# Patient Record
Sex: MALE | Race: WHITE | NOT HISPANIC OR LATINO | Employment: UNEMPLOYED | URBAN - METROPOLITAN AREA
[De-identification: names, ages, dates, MRNs, and addresses within clinical notes are randomized per-mention and may not be internally consistent; named-entity substitution may affect disease eponyms.]

---

## 2020-02-28 ENCOUNTER — HOSPITAL ENCOUNTER (INPATIENT)
Facility: MEDICAL CENTER | Age: 30
LOS: 3 days | DRG: 963 | End: 2020-03-02
Attending: EMERGENCY MEDICINE | Admitting: SURGERY
Payer: COMMERCIAL

## 2020-02-28 ENCOUNTER — HOSPITAL ENCOUNTER (OUTPATIENT)
Dept: RADIOLOGY | Facility: MEDICAL CENTER | Age: 30
End: 2020-02-28
Payer: COMMERCIAL

## 2020-02-28 ENCOUNTER — APPOINTMENT (OUTPATIENT)
Dept: RADIOLOGY | Facility: MEDICAL CENTER | Age: 30
DRG: 963 | End: 2020-02-28
Attending: EMERGENCY MEDICINE
Payer: COMMERCIAL

## 2020-02-28 ENCOUNTER — APPOINTMENT (OUTPATIENT)
Dept: RADIOLOGY | Facility: MEDICAL CENTER | Age: 30
DRG: 963 | End: 2020-02-28
Payer: COMMERCIAL

## 2020-02-28 DIAGNOSIS — S36.039A LACERATION OF SPLEEN, INITIAL ENCOUNTER: ICD-10-CM

## 2020-02-28 DIAGNOSIS — S37.032A LACERATION OF LEFT KIDNEY, INITIAL ENCOUNTER: ICD-10-CM
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
## 2020-02-28 DIAGNOSIS — T14.90XA TRAUMA: ICD-10-CM

## 2020-02-28 PROBLEM — Z53.09 CONTRAINDICATION TO DEEP VEIN THROMBOSIS (DVT) PROPHYLAXIS: Status: ACTIVE | Noted: 2020-02-28

## 2020-02-28 PROBLEM — S37.002A: Status: ACTIVE | Noted: 2020-02-28

## 2020-02-28 PROBLEM — S36.00XA: Status: ACTIVE | Noted: 2020-02-28

## 2020-02-28 LAB
ABO + RH BLD: NORMAL
ABO GROUP BLD: NORMAL
ALBUMIN SERPL BCP-MCNC: 3.7 G/DL (ref 3.2–4.9)
ALBUMIN/GLOB SERPL: 1.6 G/DL
ALP SERPL-CCNC: 39 U/L (ref 30–99)
ALT SERPL-CCNC: 15 U/L (ref 2–50)
ANION GAP SERPL CALC-SCNC: 5 MMOL/L (ref 0–11.9)
APTT PPP: 26.3 SEC (ref 24.7–36)
AST SERPL-CCNC: 21 U/L (ref 12–45)
BILIRUB SERPL-MCNC: 1.2 MG/DL (ref 0.1–1.5)
BLD GP AB SCN SERPL QL: NORMAL
BUN SERPL-MCNC: 15 MG/DL (ref 8–22)
CALCIUM SERPL-MCNC: 7.9 MG/DL (ref 8.5–10.5)
CFT BLD TEG: 3.5 MIN (ref 5–10)
CHLORIDE SERPL-SCNC: 105 MMOL/L (ref 96–112)
CLOT ANGLE BLD TEG: 72.3 DEGREES (ref 53–72)
CLOT LYSIS 30M P MA LENFR BLD TEG: 0 % (ref 0–8)
CO2 SERPL-SCNC: 23 MMOL/L (ref 20–33)
CREAT SERPL-MCNC: 0.96 MG/DL (ref 0.5–1.4)
CT.EXTRINSIC BLD ROTEM: 1.2 MIN (ref 1–3)
ERYTHROCYTE [DISTWIDTH] IN BLOOD BY AUTOMATED COUNT: 38.6 FL (ref 35.9–50)
ETHANOL BLD-MCNC: 0 G/DL
GLOBULIN SER CALC-MCNC: 2.3 G/DL (ref 1.9–3.5)
GLUCOSE SERPL-MCNC: 99 MG/DL (ref 65–99)
HCT VFR BLD AUTO: 34 % (ref 42–52)
HGB BLD-MCNC: 11 G/DL (ref 14–18)
HGB BLD-MCNC: 11.6 G/DL (ref 14–18)
INR PPP: 1.1 (ref 0.87–1.13)
LACTATE BLD-SCNC: 1.1 MMOL/L (ref 0.5–2)
MAGNESIUM SERPL-MCNC: 1.7 MG/DL (ref 1.5–2.5)
MCF BLD TEG: 64.4 MM (ref 50–70)
MCH RBC QN AUTO: 29.9 PG (ref 27–33)
MCHC RBC AUTO-ENTMCNC: 34.1 G/DL (ref 33.7–35.3)
MCV RBC AUTO: 87.6 FL (ref 81.4–97.8)
PA AA BLD-ACNC: 0 %
PA ADP BLD-ACNC: 25.2 %
PHOSPHATE SERPL-MCNC: 4 MG/DL (ref 2.5–4.5)
PLATELET # BLD AUTO: 193 K/UL (ref 164–446)
PMV BLD AUTO: 10 FL (ref 9–12.9)
POTASSIUM SERPL-SCNC: 4.5 MMOL/L (ref 3.6–5.5)
PROT SERPL-MCNC: 6 G/DL (ref 6–8.2)
PROTHROMBIN TIME: 14.4 SEC (ref 12–14.6)
RBC # BLD AUTO: 3.88 M/UL (ref 4.7–6.1)
RH BLD: NORMAL
SODIUM SERPL-SCNC: 133 MMOL/L (ref 135–145)
TEG ALGORITHM TGALG: ABNORMAL
WBC # BLD AUTO: 16.5 K/UL (ref 4.8–10.8)

## 2020-02-28 PROCEDURE — 99291 CRITICAL CARE FIRST HOUR: CPT

## 2020-02-28 PROCEDURE — 71045 X-RAY EXAM CHEST 1 VIEW: CPT

## 2020-02-28 PROCEDURE — G0390 TRAUMA RESPONS W/HOSP CRITI: HCPCS

## 2020-02-28 PROCEDURE — 86850 RBC ANTIBODY SCREEN: CPT

## 2020-02-28 PROCEDURE — 80307 DRUG TEST PRSMV CHEM ANLYZR: CPT

## 2020-02-28 PROCEDURE — 85347 COAGULATION TIME ACTIVATED: CPT

## 2020-02-28 PROCEDURE — 85610 PROTHROMBIN TIME: CPT

## 2020-02-28 PROCEDURE — A9270 NON-COVERED ITEM OR SERVICE: HCPCS | Performed by: SURGERY

## 2020-02-28 PROCEDURE — 86901 BLOOD TYPING SEROLOGIC RH(D): CPT

## 2020-02-28 PROCEDURE — 85730 THROMBOPLASTIN TIME PARTIAL: CPT

## 2020-02-28 PROCEDURE — 85018 HEMOGLOBIN: CPT

## 2020-02-28 PROCEDURE — 85384 FIBRINOGEN ACTIVITY: CPT

## 2020-02-28 PROCEDURE — 700111 HCHG RX REV CODE 636 W/ 250 OVERRIDE (IP): Performed by: SURGERY

## 2020-02-28 PROCEDURE — 85027 COMPLETE CBC AUTOMATED: CPT

## 2020-02-28 PROCEDURE — 85576 BLOOD PLATELET AGGREGATION: CPT | Mod: 91

## 2020-02-28 PROCEDURE — 770022 HCHG ROOM/CARE - ICU (200)

## 2020-02-28 PROCEDURE — 83605 ASSAY OF LACTIC ACID: CPT

## 2020-02-28 PROCEDURE — 80053 COMPREHEN METABOLIC PANEL: CPT

## 2020-02-28 PROCEDURE — 700105 HCHG RX REV CODE 258: Performed by: SURGERY

## 2020-02-28 PROCEDURE — 83735 ASSAY OF MAGNESIUM: CPT

## 2020-02-28 PROCEDURE — 86900 BLOOD TYPING SEROLOGIC ABO: CPT

## 2020-02-28 PROCEDURE — 700102 HCHG RX REV CODE 250 W/ 637 OVERRIDE(OP): Performed by: SURGERY

## 2020-02-28 PROCEDURE — 84100 ASSAY OF PHOSPHORUS: CPT

## 2020-02-28 RX ORDER — SODIUM CHLORIDE, SODIUM LACTATE, POTASSIUM CHLORIDE, CALCIUM CHLORIDE 600; 310; 30; 20 MG/100ML; MG/100ML; MG/100ML; MG/100ML
INJECTION, SOLUTION INTRAVENOUS CONTINUOUS
Status: DISCONTINUED | OUTPATIENT
Start: 2020-02-28 | End: 2020-03-01

## 2020-02-28 RX ORDER — POLYETHYLENE GLYCOL 3350 17 G/17G
1 POWDER, FOR SOLUTION ORAL 2 TIMES DAILY
Status: DISCONTINUED | OUTPATIENT
Start: 2020-02-28 | End: 2020-03-02 | Stop reason: HOSPADM

## 2020-02-28 RX ORDER — OXYCODONE HYDROCHLORIDE 5 MG/1
5 TABLET ORAL
Status: DISCONTINUED | OUTPATIENT
Start: 2020-02-28 | End: 2020-03-02 | Stop reason: HOSPADM

## 2020-02-28 RX ORDER — ONDANSETRON 2 MG/ML
4 INJECTION INTRAMUSCULAR; INTRAVENOUS EVERY 4 HOURS PRN
Status: DISCONTINUED | OUTPATIENT
Start: 2020-02-28 | End: 2020-03-02 | Stop reason: HOSPADM

## 2020-02-28 RX ORDER — ACETAMINOPHEN 500 MG
1000 TABLET ORAL EVERY 6 HOURS
Status: DISCONTINUED | OUTPATIENT
Start: 2020-02-28 | End: 2020-03-02 | Stop reason: HOSPADM

## 2020-02-28 RX ORDER — AMOXICILLIN 250 MG
1 CAPSULE ORAL
Status: DISCONTINUED | OUTPATIENT
Start: 2020-02-28 | End: 2020-03-02 | Stop reason: HOSPADM

## 2020-02-28 RX ORDER — FAMOTIDINE 20 MG/1
20 TABLET, FILM COATED ORAL 2 TIMES DAILY
Status: DISCONTINUED | OUTPATIENT
Start: 2020-02-28 | End: 2020-02-29

## 2020-02-28 RX ORDER — DOCUSATE SODIUM 100 MG/1
100 CAPSULE, LIQUID FILLED ORAL 2 TIMES DAILY
Status: DISCONTINUED | OUTPATIENT
Start: 2020-02-28 | End: 2020-03-02 | Stop reason: HOSPADM

## 2020-02-28 RX ORDER — BISACODYL 10 MG
10 SUPPOSITORY, RECTAL RECTAL
Status: DISCONTINUED | OUTPATIENT
Start: 2020-02-28 | End: 2020-03-02 | Stop reason: HOSPADM

## 2020-02-28 RX ORDER — AMOXICILLIN 250 MG
1 CAPSULE ORAL NIGHTLY
Status: DISCONTINUED | OUTPATIENT
Start: 2020-02-28 | End: 2020-02-29

## 2020-02-28 RX ORDER — HYDROMORPHONE HYDROCHLORIDE 1 MG/ML
0.5 INJECTION, SOLUTION INTRAMUSCULAR; INTRAVENOUS; SUBCUTANEOUS
Status: DISCONTINUED | OUTPATIENT
Start: 2020-02-28 | End: 2020-03-02 | Stop reason: HOSPADM

## 2020-02-28 RX ORDER — ENEMA 19; 7 G/133ML; G/133ML
1 ENEMA RECTAL
Status: DISCONTINUED | OUTPATIENT
Start: 2020-02-28 | End: 2020-03-02 | Stop reason: HOSPADM

## 2020-02-28 RX ADMIN — SENNOSIDES AND DOCUSATE SODIUM 1 TABLET: 8.6; 5 TABLET ORAL at 20:11

## 2020-02-28 RX ADMIN — HYDROMORPHONE HYDROCHLORIDE 0.5 MG: 1 INJECTION, SOLUTION INTRAMUSCULAR; INTRAVENOUS; SUBCUTANEOUS at 17:27

## 2020-02-28 RX ADMIN — ACETAMINOPHEN 1000 MG: 500 TABLET ORAL at 23:22

## 2020-02-28 RX ADMIN — FAMOTIDINE 20 MG: 20 TABLET ORAL at 17:28

## 2020-02-28 RX ADMIN — SODIUM CHLORIDE, POTASSIUM CHLORIDE, SODIUM LACTATE AND CALCIUM CHLORIDE: 600; 310; 30; 20 INJECTION, SOLUTION INTRAVENOUS at 19:15

## 2020-02-28 RX ADMIN — ACETAMINOPHEN 1000 MG: 500 TABLET ORAL at 17:28

## 2020-02-28 ASSESSMENT — LIFESTYLE VARIABLES: EVER_SMOKED: NEVER

## 2020-02-28 ASSESSMENT — FIBROSIS 4 INDEX: FIB4 SCORE: 0.76

## 2020-02-29 ENCOUNTER — APPOINTMENT (OUTPATIENT)
Dept: RADIOLOGY | Facility: MEDICAL CENTER | Age: 30
DRG: 963 | End: 2020-02-29
Attending: SURGERY
Payer: COMMERCIAL

## 2020-02-29 LAB
ALBUMIN SERPL BCP-MCNC: 3.7 G/DL (ref 3.2–4.9)
ALBUMIN/GLOB SERPL: 1.7 G/DL
ALP SERPL-CCNC: 37 U/L (ref 30–99)
ALT SERPL-CCNC: 14 U/L (ref 2–50)
ANION GAP SERPL CALC-SCNC: 5 MMOL/L (ref 0–11.9)
AST SERPL-CCNC: 20 U/L (ref 12–45)
BASOPHILS # BLD AUTO: 0.2 % (ref 0–1.8)
BASOPHILS # BLD: 0.02 K/UL (ref 0–0.12)
BILIRUB SERPL-MCNC: 2.1 MG/DL (ref 0.1–1.5)
BUN SERPL-MCNC: 10 MG/DL (ref 8–22)
CALCIUM SERPL-MCNC: 8.3 MG/DL (ref 8.5–10.5)
CHLORIDE SERPL-SCNC: 104 MMOL/L (ref 96–112)
CO2 SERPL-SCNC: 26 MMOL/L (ref 20–33)
CREAT SERPL-MCNC: 1.05 MG/DL (ref 0.5–1.4)
EOSINOPHIL # BLD AUTO: 0.04 K/UL (ref 0–0.51)
EOSINOPHIL NFR BLD: 0.5 % (ref 0–6.9)
ERYTHROCYTE [DISTWIDTH] IN BLOOD BY AUTOMATED COUNT: 39.4 FL (ref 35.9–50)
GLOBULIN SER CALC-MCNC: 2.2 G/DL (ref 1.9–3.5)
GLUCOSE SERPL-MCNC: 85 MG/DL (ref 65–99)
HCT VFR BLD AUTO: 31.8 % (ref 42–52)
HGB BLD-MCNC: 11 G/DL (ref 14–18)
HGB BLD-MCNC: 11.8 G/DL (ref 14–18)
IMM GRANULOCYTES # BLD AUTO: 0.02 K/UL (ref 0–0.11)
IMM GRANULOCYTES NFR BLD AUTO: 0.2 % (ref 0–0.9)
LYMPHOCYTES # BLD AUTO: 2.14 K/UL (ref 1–4.8)
LYMPHOCYTES NFR BLD: 24.3 % (ref 22–41)
MAGNESIUM SERPL-MCNC: 1.9 MG/DL (ref 1.5–2.5)
MCH RBC QN AUTO: 30.6 PG (ref 27–33)
MCHC RBC AUTO-ENTMCNC: 34.6 G/DL (ref 33.7–35.3)
MCV RBC AUTO: 88.6 FL (ref 81.4–97.8)
MONOCYTES # BLD AUTO: 0.67 K/UL (ref 0–0.85)
MONOCYTES NFR BLD AUTO: 7.6 % (ref 0–13.4)
NEUTROPHILS # BLD AUTO: 5.91 K/UL (ref 1.82–7.42)
NEUTROPHILS NFR BLD: 67.2 % (ref 44–72)
NRBC # BLD AUTO: 0 K/UL
NRBC BLD-RTO: 0 /100 WBC
PHOSPHATE SERPL-MCNC: 3.4 MG/DL (ref 2.5–4.5)
PLATELET # BLD AUTO: 173 K/UL (ref 164–446)
PMV BLD AUTO: 10 FL (ref 9–12.9)
POTASSIUM SERPL-SCNC: 3.9 MMOL/L (ref 3.6–5.5)
PROT SERPL-MCNC: 5.9 G/DL (ref 6–8.2)
RBC # BLD AUTO: 3.59 M/UL (ref 4.7–6.1)
SODIUM SERPL-SCNC: 135 MMOL/L (ref 135–145)
WBC # BLD AUTO: 8.8 K/UL (ref 4.8–10.8)

## 2020-02-29 PROCEDURE — 700112 HCHG RX REV CODE 229: Performed by: SURGERY

## 2020-02-29 PROCEDURE — 93970 EXTREMITY STUDY: CPT | Mod: 26 | Performed by: INTERNAL MEDICINE

## 2020-02-29 PROCEDURE — 83735 ASSAY OF MAGNESIUM: CPT

## 2020-02-29 PROCEDURE — 80053 COMPREHEN METABOLIC PANEL: CPT

## 2020-02-29 PROCEDURE — 85018 HEMOGLOBIN: CPT

## 2020-02-29 PROCEDURE — 700102 HCHG RX REV CODE 250 W/ 637 OVERRIDE(OP): Performed by: SURGERY

## 2020-02-29 PROCEDURE — 700105 HCHG RX REV CODE 258: Performed by: SURGERY

## 2020-02-29 PROCEDURE — 99231 SBSQ HOSP IP/OBS SF/LOW 25: CPT | Performed by: SURGERY

## 2020-02-29 PROCEDURE — A9270 NON-COVERED ITEM OR SERVICE: HCPCS | Performed by: SURGERY

## 2020-02-29 PROCEDURE — 700111 HCHG RX REV CODE 636 W/ 250 OVERRIDE (IP): Performed by: SURGERY

## 2020-02-29 PROCEDURE — 84100 ASSAY OF PHOSPHORUS: CPT

## 2020-02-29 PROCEDURE — 85025 COMPLETE CBC W/AUTO DIFF WBC: CPT

## 2020-02-29 PROCEDURE — 93970 EXTREMITY STUDY: CPT

## 2020-02-29 PROCEDURE — 770006 HCHG ROOM/CARE - MED/SURG/GYN SEMI*

## 2020-02-29 PROCEDURE — 71045 X-RAY EXAM CHEST 1 VIEW: CPT

## 2020-02-29 RX ORDER — LISDEXAMFETAMINE DIMESYLATE 20 MG/1
20 CAPSULE ORAL DAILY
COMMUNITY

## 2020-02-29 RX ORDER — LISDEXAMFETAMINE DIMESYLATE CAPSULES 20 MG/1
20 CAPSULE ORAL DAILY
Status: DISCONTINUED | OUTPATIENT
Start: 2020-03-01 | End: 2020-02-29

## 2020-02-29 RX ADMIN — FAMOTIDINE 20 MG: 20 TABLET ORAL at 05:13

## 2020-02-29 RX ADMIN — ACETAMINOPHEN 1000 MG: 500 TABLET ORAL at 11:43

## 2020-02-29 RX ADMIN — SODIUM CHLORIDE, POTASSIUM CHLORIDE, SODIUM LACTATE AND CALCIUM CHLORIDE: 600; 310; 30; 20 INJECTION, SOLUTION INTRAVENOUS at 03:27

## 2020-02-29 RX ADMIN — ACETAMINOPHEN 1000 MG: 500 TABLET ORAL at 05:13

## 2020-02-29 RX ADMIN — ACETAMINOPHEN 1000 MG: 500 TABLET ORAL at 23:45

## 2020-02-29 RX ADMIN — DOCUSATE SODIUM 100 MG: 100 CAPSULE, LIQUID FILLED ORAL at 05:13

## 2020-02-29 RX ADMIN — ONDANSETRON 4 MG: 2 INJECTION INTRAMUSCULAR; INTRAVENOUS at 13:50

## 2020-02-29 RX ADMIN — DOCUSATE SODIUM 100 MG: 100 CAPSULE, LIQUID FILLED ORAL at 18:12

## 2020-02-29 RX ADMIN — FAMOTIDINE 20 MG: 20 TABLET ORAL at 18:12

## 2020-02-29 RX ADMIN — ACETAMINOPHEN 1000 MG: 500 TABLET ORAL at 18:12

## 2020-02-29 RX ADMIN — OXYCODONE HYDROCHLORIDE 5 MG: 5 TABLET ORAL at 23:45

## 2020-02-29 RX ADMIN — OXYCODONE HYDROCHLORIDE 5 MG: 5 TABLET ORAL at 13:50

## 2020-02-29 RX ADMIN — ONDANSETRON 4 MG: 2 INJECTION INTRAMUSCULAR; INTRAVENOUS at 23:48

## 2020-02-29 ASSESSMENT — PATIENT HEALTH QUESTIONNAIRE - PHQ9
SUM OF ALL RESPONSES TO PHQ9 QUESTIONS 1 AND 2: 0
2. FEELING DOWN, DEPRESSED, IRRITABLE, OR HOPELESS: NOT AT ALL
1. LITTLE INTEREST OR PLEASURE IN DOING THINGS: NOT AT ALL

## 2020-02-29 ASSESSMENT — LIFESTYLE VARIABLES
EVER_SMOKED: NEVER
TOTAL SCORE: 0
ALCOHOL_USE: NO
AVERAGE NUMBER OF DAYS PER WEEK YOU HAVE A DRINK CONTAINING ALCOHOL: 2
EVER FELT BAD OR GUILTY ABOUT YOUR DRINKING: NO
EVER HAD A DRINK FIRST THING IN THE MORNING TO STEADY YOUR NERVES TO GET RID OF A HANGOVER: NO
CONSUMPTION TOTAL: POSITIVE
HOW MANY TIMES IN THE PAST YEAR HAVE YOU HAD 5 OR MORE DRINKS IN A DAY: 5
HAVE YOU EVER FELT YOU SHOULD CUT DOWN ON YOUR DRINKING: NO
DOES PATIENT WANT TO STOP DRINKING: NO
TOTAL SCORE: 0
TOTAL SCORE: 0
ON A TYPICAL DAY WHEN YOU DRINK ALCOHOL HOW MANY DRINKS DO YOU HAVE: 3
SUBSTANCE_ABUSE: 0
HAVE PEOPLE ANNOYED YOU BY CRITICIZING YOUR DRINKING: NO

## 2020-02-29 ASSESSMENT — ENCOUNTER SYMPTOMS
DOUBLE VISION: 0
SHORTNESS OF BREATH: 0
FLANK PAIN: 0
FEVER: 0
SPEECH CHANGE: 0
SENSORY CHANGE: 0
FOCAL WEAKNESS: 0
MYALGIAS: 1
ABDOMINAL PAIN: 1

## 2020-02-29 ASSESSMENT — COGNITIVE AND FUNCTIONAL STATUS - GENERAL
SUGGESTED CMS G CODE MODIFIER DAILY ACTIVITY: CH
DAILY ACTIVITIY SCORE: 24
MOBILITY SCORE: 24
SUGGESTED CMS G CODE MODIFIER MOBILITY: CH

## 2020-02-29 ASSESSMENT — COPD QUESTIONNAIRES
IN THE PAST 12 MONTHS DO YOU DO LESS THAN YOU USED TO BECAUSE OF YOUR BREATHING PROBLEMS: DISAGREE/UNSURE
HAVE YOU SMOKED AT LEAST 100 CIGARETTES IN YOUR ENTIRE LIFE: NO/DON'T KNOW
DO YOU EVER COUGH UP ANY MUCUS OR PHLEGM?: NO/ONLY WITH OCCASIONAL COLDS OR INFECTIONS
DURING THE PAST 4 WEEKS HOW MUCH DID YOU FEEL SHORT OF BREATH: NONE/LITTLE OF THE TIME

## 2020-02-29 ASSESSMENT — FIBROSIS 4 INDEX: FIB4 SCORE: 0.83

## 2020-02-29 NOTE — ASSESSMENT & PLAN NOTE
Snowboarding crash. Blunt torso trauma.  Trauma Yellow Transfer Activation from [facility].  Gene Guallpa MD. Trauma Surgery.

## 2020-02-29 NOTE — CARE PLAN
Problem: Communication  Goal: The ability to communicate needs accurately and effectively will improve  Outcome: PROGRESSING AS EXPECTED  Note: Patient is oriented, uses call light appropriately, notifies staff to needs.      Problem: Venous Thromboembolism (VTW)/Deep Vein Thrombosis (DVT) Prevention:  Goal: Patient will participate in Venous Thrombosis (VTE)/Deep Vein Thrombosis (DVT)Prevention Measures  Outcome: PROGRESSING AS EXPECTED  Note: Pharmacologic prophylaxis contraindicated, pt unable to mobilize d/t strict BR orders, SCDs in place, pt moves within confines of bed.      Problem: Knowledge Deficit  Goal: Knowledge of disease process/condition, treatment plan, diagnostic tests, and medications will improve  Outcome: PROGRESSING AS EXPECTED  Note: Keeping patient updated to plan of care, tests, results.

## 2020-02-29 NOTE — ED NOTES
Pt brought in by [facility]. Pt was snowboarding, hit icy spot and fell. Pt complaining of abdominal pain.

## 2020-02-29 NOTE — ASSESSMENT & PLAN NOTE
Anterior and inferior splenic injury with moderate hemoperitoneum.   Grade 4 injury.  Non operative management.  2/29 two stable Hgb, mobilize.  3/2 Hgb trending up.  Trend abdominal exam and laboratory studies.

## 2020-02-29 NOTE — PROGRESS NOTES
Pt arrived to S119 with ACLS RN. VSS.    2 RN skin check with BIANCA Zapata.  No areas of concern noted. Pt repositioning self in bed.    Belongings checked with BIANCA Zapata.   Apple watch  Apple cell phone  White cable headphones  Black baseball cap  Karely ski socks  Arcteryx ski pants  Samuel sweater  White long sleeve thermal  Black helly landaverde ski jacket  Sunglasses  Neck gaiter  Ski boots  Gray knit hat

## 2020-02-29 NOTE — CARE PLAN
Problem: Communication  Goal: The ability to communicate needs accurately and effectively will improve  Outcome: PROGRESSING AS EXPECTED     Problem: Safety  Goal: Will remain free from injury  Outcome: PROGRESSING AS EXPECTED     Problem: Venous Thromboembolism (VTW)/Deep Vein Thrombosis (DVT) Prevention:  Goal: Patient will participate in Venous Thrombosis (VTE)/Deep Vein Thrombosis (DVT)Prevention Measures  Outcome: PROGRESSING AS EXPECTED     Problem: Knowledge Deficit  Goal: Knowledge of disease process/condition, treatment plan, diagnostic tests, and medications will improve  Outcome: PROGRESSING AS EXPECTED     Problem: Pain Management  Goal: Pain level will decrease to patient's comfort goal  Outcome: PROGRESSING AS EXPECTED

## 2020-02-29 NOTE — PROGRESS NOTES
Trauma Progress Note 2/29/2020 4:16 AM    This is a 29 y.o. male who crashed while snowboarding. He sustained grade 3 spleen injury managed non operatively. His hemogram has been stable through the night.     Plan:   - continue serial hemograms and abdominal exams    Assessment: awake, alert, pain well controlled    /55   Pulse 75   Temp 36.8 °C (98.3 °F) (Temporal)   Resp (!) 32   Wt 77.1 kg (169 lb 15.6 oz)   SpO2 98%     Hemoglobin: 11.0 g/dL  Hematocrit: 31.8 %    Lactic Acid: 1.1 mmol/L.    Urine Output: voiding / adequate output    Recent Labs     02/28/20  1640   APTT 26.3   INR 1.10      Recent Labs     02/28/20  1700   REACTMIN 3.5*   CLOTKINET 1.2   CLOTANGL 72.3*   MAXCLOTS 64.4   UQX36OAB 0.0   PRCINADP 25.2   PRCINAA 0.0     Traumatic injury of the kidney, left, initial encounter- (present on admission)  Assessment & Plan  Small horizontal laceration to left kidney. Grade 3 injury.  Non operative management.  Trend abdominal exam and laboratory studies.    Trauma to spleen, initial encounter- (present on admission)  Assessment & Plan  Anterior and inferior splenic injury with moderate hemoperitoneum.   Grade 4 injury  Non operative management.  Trend abdominal exam and laboratory studies. Bed rest.    Contraindication to deep vein thrombosis (DVT) prophylaxis- (present on admission)  Assessment & Plan  Systemic anticoagulation contraindicated secondary to elevated bleeding risk.  3/2 Surveillance venous duplex scanning ordered.    Trauma- (present on admission)  Assessment & Plan  Snowboarding crash. Blunt torso trauma.  Trauma Yellow Transfer Activation from [facility].  Gene Guallpa MD. Trauma Surgery.

## 2020-02-29 NOTE — PROGRESS NOTES
2 RN skin check completed with BIANCA Stevens. Patient skin intact,no skin breakdown noted on assessment, right elbow red and blanching.

## 2020-02-29 NOTE — PROGRESS NOTES
"TRAUMA TERTIARY SURVEY     Mental status adequate for full examination?: Yes    Spine cleared (radiologically and/or clinically): Yes    PHYSICAL EXAMINATION:  Vitals: /73   Pulse 78   Temp 36.8 °C (98.3 °F) (Temporal)   Resp (!) 26   Ht 1.727 m (5' 8\")   Wt 80.2 kg (176 lb 12.9 oz)   SpO2 98%   BMI 26.88 kg/m²   Constitutional:     General Appearance: appears stated age.  HEENT:     No significant external craniofacial trauma. The pupils are equal, round, and reactive to light bilaterally. The extraocular muscles are intact bilaterally. The nares and oropharynx are clear. The midface and jaw are stable. No malocclusion is evident.  Neck:    Normal range of motion.  Respiratory:   Inspection: adominnal pain with inspiration   Palpation:  The chest is nontender. The clavicles are non deformed bilaterally..   Auscultation: clear to auscultation.  Cardiovascular:   Auscultation: regular rate and rhythm.   Peripheral Pulses: Normal.   Abdomen:   Left upper quadrant tenderness with palpation  Genitourinary:   (MALE): urine concentrated without blood.  Musculoskeletal:   The pelvis is stable.  No significant angulation, deformity, or soft tissue injury involving the upper and lower extremities. Normal range of motion.   Back:   The thoracolumbar spine was examined. Examination is remarkable for no significant tenderness, swelling, or deformity in the thoracolumbar region.  Skin:   The skin is warm and dry.  Neurologic:    [place] Coma Scale (GCS) 15 [id number]. Neurologic examination revealed no focal deficits noted.  Psychiatric:   The patient does not appear depressed or anxious.    IMAGING:  DX-CHEST-PORTABLE (1 VIEW)   Final Result      No acute cardiopulmonary abnormality. No interval change.      DX-CHEST-LIMITED (1 VIEW)   Final Result      No acute cardiopulmonary process is identified.      OUTSIDE IMAGES-CT ABDOMEN /PELVIS   Final Result      US-ABORTED US PROCEDURE    (Results Pending)   US-TRAUMA VEIN " SCREEN LOWER BILAT EXTREMITY    (Results Pending)     All current laboratory studies/radiology exams reviewed: Yes    Completed Consultations:  No consults required with this hospitalization     Pending Consultations:  none    Newly Identified Diagnoses and Injuries:  No further findings    TOTAL RAP SCORE:  RAP Score Total: 2      ETOH Screening  CAGE Score: 0  Assessment complete date: 2/29/2020

## 2020-02-29 NOTE — ASSESSMENT & PLAN NOTE
Systemic anticoagulation contraindicated secondary to elevated bleeding risk.  3/2 Surveillance venous duplex scanning ordered.

## 2020-02-29 NOTE — RESPIRATORY CARE
Responded to trauma yellow. Pt sat 97% on RA. No respiratory distress noted. Respiratory released by ER MD.

## 2020-02-29 NOTE — ED PROVIDER NOTES
ED Provider Note    CHIEF COMPLAINT  Splenic laceration, snowboard accident, trauma transfer    HPI  Yoan Brito is a 29 y.o. male who presents via transfer for evaluation of a splenic and renal laceration status post snowboard accident.  Comes in from [facility] in [facility], the patient complains mainly of abdominal pain, primarily in the left upper quadrant but states that in the ensuing hours since the accident he is developed left shoulder pain.  He did not have shoulder pain or chest pain initially after the accident has no shortness of breath.  He had a CT of the abdomen and pelvis revealing these injuries along with a significant amount of hemoperitoneum.  He does not of a headache or neck pain or back pain, he has no weakness numbness or tingling, no medical problems and at this time offers no other complaints.    REVIEW OF SYSTEMS  Negative for headache, neck pain, focal weakness, focal numbness, focal tingling, back pain. All other systems are negative.     PAST MEDICAL HISTORY  No past medical history on file.    FAMILY HISTORY  No family history on file.    SOCIAL HISTORY  Social History     Tobacco Use   • Smoking status: Not on file   Substance Use Topics   • Alcohol use: Not on file   • Drug use: Not on file       SURGICAL HISTORY  No past surgical history on file.    CURRENT MEDICATIONS  I personally reviewed the medication list in the charting documentation.     ALLERGIES  Allergies not on file    MEDICAL RECORD  I have reviewed patient's medical record and pertinent results are listed above.      PHYSICAL EXAM  VITAL SIGNS: /51   Pulse 87   Temp 37.1 °C (98.8 °F)   Resp 18   SpO2 99% Comment: RA   Constitutional: An alert patient in no acute distress  HENT: Mucus membranes moist.  Oropharynx is clear. Head is atraumatic.  Eyes: Pupils are equal and reactive to light. EOMI. Normal conjunctiva.    Neck: Nontender, comfortable full range of motion.  Cardiovascular:  Regular heart rate and rhythm.   Thorax & Lungs: Chest is nontender. No ecchymosis, abrasions or other traumatic injury noted.  Lungs are clear to auscultation with good air movement bilaterally.  Abdomen: Soft, moderate generalized tenderness, the abdomen is firm but not distended no ecchymosis, abrasions or other traumatic injury noted.  Skin: Warm, dry. No rash appreciated  Extremities/Musculoskeletal: Full range of motion of left shoulder, no deformity, no tenderness, neurovascularly intact.  The other 3 extremities are without trauma.  Neurologic: Alert & oriented. CN II-XII grossly intact. Normal and symmetric motor and sensory functions upper and lower extremities bilaterally. No focal deficits observed.   Psychiatric: Normal affect appropriate for the clinical situation.    DIAGNOSTIC STUDIES / PROCEDURES    LABS  Results for orders placed or performed during the hospital encounter of 02/28/20   DIAGNOSTIC ALCOHOL   Result Value Ref Range    Diagnostic Alcohol 0.00 0.00 g/dL   CBC WITHOUT DIFFERENTIAL   Result Value Ref Range    WBC 16.5 (H) 4.8 - 10.8 K/uL    RBC 3.88 (L) 4.70 - 6.10 M/uL    Hemoglobin 11.6 (L) 14.0 - 18.0 g/dL    Hematocrit 34.0 (L) 42.0 - 52.0 %    MCV 87.6 81.4 - 97.8 fL    MCH 29.9 27.0 - 33.0 pg    MCHC 34.1 33.7 - 35.3 g/dL    RDW 38.6 35.9 - 50.0 fL    Platelet Count 193 164 - 446 K/uL    MPV 10.0 9.0 - 12.9 fL   Comp Metabolic Panel   Result Value Ref Range    Sodium 133 (L) 135 - 145 mmol/L    Potassium 4.5 3.6 - 5.5 mmol/L    Chloride 105 96 - 112 mmol/L    Co2 23 20 - 33 mmol/L    Anion Gap 5.0 0.0 - 11.9    Glucose 99 65 - 99 mg/dL    Bun 15 8 - 22 mg/dL    Creatinine 0.96 0.50 - 1.40 mg/dL    Calcium 7.9 (L) 8.5 - 10.5 mg/dL    AST(SGOT) 21 12 - 45 U/L    ALT(SGPT) 15 2 - 50 U/L    Alkaline Phosphatase 39 30 - 99 U/L    Total Bilirubin 1.2 0.1 - 1.5 mg/dL    Albumin 3.7 3.2 - 4.9 g/dL    Total Protein 6.0 6.0 - 8.2 g/dL    Globulin 2.3 1.9 - 3.5 g/dL    A-G Ratio 1.6 g/dL    Prothrombin Time   Result Value Ref Range    PT 14.4 12.0 - 14.6 sec    INR 1.10 0.87 - 1.13   APTT   Result Value Ref Range    APTT 26.3 24.7 - 36.0 sec   COD - Adult (Type and Screen)   Result Value Ref Range    ABO Grouping Only B     Rh Grouping Only POS     Antibody Screen-Cod NEG    ABO Rh Confirm   Result Value Ref Range    ABO Rh Confirm B POS    Platelet Mapping (TEG)   Result Value Ref Range    Reaction Time Initial-R 3.5 (L) 5.0 - 10.0 min    Clot Kinetics-K 1.2 1.0 - 3.0 min    Clot Angle-Angle 72.3 (H) 53.0 - 72.0 degrees    Maximum Clot Strength-MA 64.4 50.0 - 70.0 mm    Lysis 30 minutes-LY30 0.0 0.0 - 8.0 %    % Inhibition ADP 25.2 %    % Inhibition AA 0.0 %    TEG Algorithm Link Algorithm    ESTIMATED GFR   Result Value Ref Range    GFR If African American >60 >60 mL/min/1.73 m 2    GFR If Non African American >60 >60 mL/min/1.73 m 2        COURSE & MEDICAL DECISION MAKING  I have reviewed any medical record information, laboratory studies and radiographic results as noted above.    Encounter Summary: This is a 29 y.o. male with splenic laceration as well as a small renal laceration status post snowboarding accident, hemodynamically stable with normal blood pressure normal heart rate, seems to be an isolated injury although he is complaining of left shoulder pain which is likely referred, he does not have overt evidence of trauma and dislocation, chest x-ray in the trauma bay reveals no pneumothorax or obvious bony abnormality.  Will obtain blood work to trend his hemoglobin, the patient will be admitted to the hospital in guarded condition    DISPOSITION: Admit in guarded condition      FINAL IMPRESSION  1. Laceration of spleen, initial encounter    2. Laceration of left kidney, initial encounter    3. Snowboard accident, initial encounter           This dictation was created using voice recognition software. The accuracy of the dictation is limited to the abilities of the software. I expect there  may be some errors of grammar and possibly content. The nursing notes were reviewed and certain aspects of this information were incorporated into this note.    Electronically signed by: Hernan Bacon M.D., 2/28/2020 5:01 PM

## 2020-02-29 NOTE — H&P
Trauma History and Physical  2/28/2020    Attending Physician: Gene Guallpa MD.     CC: Trauma The patient was triaged as a Trauma Yellow Transfer in accordance with established pre hospital protols. An expeditious primary and secondary survey with required adjuncts was conducted. See Trauma Narrator for full details.    HPI: This is a 29 y.o male presents to [facility] for for injuries sustained while snowboarding.  This is a transfer from [facility] in [place] for evaluation of a splenic and renal laceration status.   The patient complains mainly of abdominal pain, primarily in the left upper quadrant as well as left shoulder pain.  He did not have shoulder pain or chest pain initially after the accident.  He has no shortness of breath.  He had a CT of the abdomen and pelvis revealing a grade 3 left renal injury as well as a grade 4 splenic injury along with a significant amount of hemoperitoneum.      He does not of a headache or neck pain or back pain, he has no weakness numbness or tingling, no medical problems and at this time offers no other complaints.   He has not taken ASA or NSAIDs    No past medical history on file.    No past surgical history on file.    Current Facility-Administered Medications   Medication Dose Route Frequency Provider Last Rate Last Dose   • Respiratory Therapy Consult   Nebulization Continuous RT Gene Guallpa M.D.       • Pharmacy Consult Request ...Pain Management Review 1 Each  1 Each Other PHARMACY TO DOSE Gene Guallpa M.D.       • docusate sodium (COLACE) capsule 100 mg  100 mg Oral BID Gene Guallpa M.D.       • senna-docusate (PERICOLACE or SENOKOT S) 8.6-50 MG per tablet 1 Tab  1 Tab Oral Nightly Gene Guallpa M.D.       • senna-docusate (PERICOLACE or SENOKOT S) 8.6-50 MG per tablet 1 Tab  1 Tab Oral Q24HRS PRN Gene Guallpa M.D.       • polyethylene glycol/lytes (MIRALAX) PACKET 1 Packet  1 Packet  Oral BID Gene Guallpa M.D.       • magnesium hydroxide (MILK OF MAGNESIA) suspension 30 mL  30 mL Oral DAILY Gene Guallpa M.D.       • bisacodyl (DULCOLAX) suppository 10 mg  10 mg Rectal Q24HRS PRN Gene Guallpa M.D.       • fleet enema 133 mL  1 Each Rectal Once PRN Gene Guallpa M.D.       • LR infusion   Intravenous Continuous Gene Guallpa M.D.       • acetaminophen (TYLENOL) tablet 1,000 mg  1,000 mg Oral Q6HRS Gene Guallpa M.D.   1,000 mg at 02/28/20 1728   • HYDROmorphone pf (DILAUDID) injection 0.5 mg  0.5 mg Intravenous Q HOUR PRN Gene Guallpa M.D.   0.5 mg at 02/28/20 1727   • oxyCODONE immediate-release (ROXICODONE) tablet 5 mg  5 mg Oral Q3HRS PRN Gene Guallpa M.D.       • ondansetron (ZOFRAN) syringe/vial injection 4 mg  4 mg Intravenous Q4HRS PRN Gene Guallpa M.D.       • famotidine (PEPCID) tablet 20 mg  20 mg Oral BID Gene Guallpa M.D.   20 mg at 02/28/20 1728    Or   • famotidine (PEPCID) injection 20 mg  20 mg Intravenous BID Gene Guallpa M.D.           Social History     Socioeconomic History   • Marital status: Not on file     Spouse name: Not on file   • Number of children: Not on file   • Years of education: Not on file   • Highest education level: Not on file   Occupational History   • Not on file   Social Needs   • Financial resource strain: Not on file   • Food insecurity     Worry: Not on file     Inability: Not on file   • Transportation needs     Medical: Not on file     Non-medical: Not on file   Tobacco Use   • Smoking status: Not on file   Substance and Sexual Activity   • Alcohol use: Not on file   • Drug use: Not on file   • Sexual activity: Not on file   Lifestyle   • Physical activity     Days per week: Not on file     Minutes per session: Not on file   • Stress: Not on file   Relationships   • Social connections     Talks on phone: Not on file     Gets together: Not on file     Attends Rastafari  service: Not on file     Active member of club or organization: Not on file     Attends meetings of clubs or organizations: Not on file     Relationship status: Not on file   • Intimate partner violence     Fear of current or ex partner: Not on file     Emotionally abused: Not on file     Physically abused: Not on file     Forced sexual activity: Not on file   Other Topics Concern   • Not on file   Social History Narrative   • Not on file       No family history on file.    Allergies:  Patient has no allergy information on record.    Review of Systems:  Constitutional: Negative for fever, chills, weight loss, malaise/fatigue and diaphoresis.   HENT: Negative for hearing loss, ear pain, nosebleeds, congestion, sore throat, neck pain, and ear discharge.    Eyes: Negative for blurred vision, double vision, and redness.   Respiratory: Negative for cough, sputum production, shortness of breath, wheezing and stridor.    Cardiovascular: Negative for chest pain, palpitations.   Gastrointestinal: Negative for heartburn, nausea, vomiting, diarrhea, constipation.  Positive for abdominal pain, .  Genitourinary: Negative for dysuria, urgency, frequency.   Musculoskeletal: Negative for myalgias, back pain, joint pain and falls. Positive for left shoulder pain.  Skin: Negative for itching and rash.  Neurological: Negative for dizziness, loss of consciousness, weakness and headaches.   Endo/Heme/Allergies: Negative for environmental allergies. Does not bruise/bleed easily.   Psychiatric/Behavioral: Negative for depression and substance abuse. The patient is not nervous/anxious.    Physical Exam:  /60   Pulse 79   Temp 36.2 °C (97.1 °F) (Temporal)   Resp 19   Wt 77.1 kg (169 lb 15.6 oz)   SpO2 97%     Constitutional: Awake, alert, oriented x3. No acute distress. GCS 15. E4 V5 M6.  Head: No cephalohematoma. Pupils are 2 mm,  reactive bilaterally. Midface stable. No malocclusion.  TMs clear bilaterally. No drainage from the  mouth or nose.  Neck: No tracheal deviation. No midline cervical spine tenderness.   Cardiovascular: Normal rate, regular rhythm, normal heart sounds and intact distal pulses.  Exam reveals no gallop and no friction rub.  No murmur heard.  Pulmonary/Chest: Clavicles nontender to palpation. There is no chest wall tenderness bilaterally.  No crepitus. Positive breath sounds bilaterally.   Abdominal: Soft, nondistended.  Mildly tender to palpation -greatest in the left upper quadrant.  There is no guarding or rebound tenderness. Pelvis is stable to anterior-posterior compression.   Musculoskeletal: Right upper extremity grossly atraumatic, palpable radial pulse. 5/5  strength. Full ROM and strength at elbow.  Left upper extremity grossly atraumatic, palpable radial pulse. 5/5  strength. Full ROM and strength at elbow.  Right lower extremity grossly atraumatic. 5/5 strength in ankle plantar flexion and dorsiflexion. No pain and full ROM at right knee and hip.   Left  lower extremity grossly atraumatic. 5/5 strength in ankle plantar flexion and dorsiflexion. No pain and full ROM at left knee and hip.   Back: Midline thoracic and lumbar spines are nontender to palpation. No step-offs.   : Normal male external genitalia. Rectal exam not done. No blood visible at urethral meatus.   Neurological: Sensation intact to light touch dorsum and plantar surfaces of both feet and the medial and lateral aspects of both lower legs.  Sensation intact to light touch dorsum and plantar surfaces of both hands.   Skin: Skin is warm and dry.  No diaphoresis. No erythema. No pallor.     Labs:  Recent Labs     02/28/20  1640   WBC 16.5*   RBC 3.88*   HEMOGLOBIN 11.6*   HEMATOCRIT 34.0*   MCV 87.6   MCH 29.9   MCHC 34.1   RDW 38.6   PLATELETCT 193   MPV 10.0     Recent Labs     02/28/20  1640   SODIUM 133*   POTASSIUM 4.5   CHLORIDE 105   CO2 23   GLUCOSE 99   BUN 15   CREATININE 0.96   CALCIUM 7.9*     Recent Labs     02/28/20  1640    APTT 26.3   INR 1.10     Recent Labs     02/28/20  1640   ASTSGOT 21   ALTSGPT 15   TBILIRUBIN 1.2   ALKPHOSPHAT 39   GLOBULIN 2.3   INR 1.10       Radiology:  DX-CHEST-LIMITED (1 VIEW)   Final Result      No acute cardiopulmonary process is identified.      OUTSIDE IMAGES-CT ABDOMEN /PELVIS   Final Result      US-ABORTED US PROCEDURE    (Results Pending)   DX-CHEST-PORTABLE (1 VIEW)    (Results Pending)   US-TRAUMA VEIN SCREEN LOWER BILAT EXTREMITY    (Results Pending)         Assessment: This is a 29 y.o male snowboarder with grade 4 splenic injury and a grade 3 left renal injury    Plan:     Admit to the ICU  Serial hemoglobins  TEG with platelet mapping -treat if abnormal  Continue bedrest  Trauma duplex of the lower extremities ordered for tomorrow      Trauma  Snowboarding crash. Blunt torso trauma.  Trauma Yellow Transfer Activation from [facility].  Gene Guallpa MD. Trauma Surgery.    Contraindication to deep vein thrombosis (DVT) prophylaxis  Systemic anticoagulation contraindicated secondary to elevated bleeding risk.  3/2 Surveillance venous duplex scanning ordered.    Trauma to spleen, initial encounter  Anterior and inferior splenic injury with moderate hemoperitoneum.   Grade 4 injury  Non operative management.  Trend abdominal exam and laboratory studies. Bed rest.    Traumatic injury of the kidney, left, initial encounter  Small horizontal laceration to left kidney. Grade 3 injury.  Non operative management.  Trend abdominal exam and laboratory studies.    Time spent: Trauma / Critical Care Time 60 minutes excluding procedures.    Gene Guallpa MD  [facility]  [phone]

## 2020-02-29 NOTE — ASSESSMENT & PLAN NOTE
Small horizontal laceration to left kidney.   Grade 3 injury.  Non operative management  3/2 BUN/Cr within normal limits    Trend abdominal exam and laboratory studies.
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
## 2020-03-01 ENCOUNTER — APPOINTMENT (OUTPATIENT)
Dept: RADIOLOGY | Facility: MEDICAL CENTER | Age: 30
DRG: 963 | End: 2020-03-01
Attending: SURGERY
Payer: COMMERCIAL

## 2020-03-01 LAB
ALBUMIN SERPL BCP-MCNC: 4.3 G/DL (ref 3.2–4.9)
ALBUMIN/GLOB SERPL: 1.5 G/DL
ALP SERPL-CCNC: 44 U/L (ref 30–99)
ALT SERPL-CCNC: 14 U/L (ref 2–50)
ANION GAP SERPL CALC-SCNC: 8 MMOL/L (ref 0–11.9)
AST SERPL-CCNC: 17 U/L (ref 12–45)
BASOPHILS # BLD AUTO: 0.2 % (ref 0–1.8)
BASOPHILS # BLD AUTO: 0.3 % (ref 0–1.8)
BASOPHILS # BLD: 0.03 K/UL (ref 0–0.12)
BASOPHILS # BLD: 0.03 K/UL (ref 0–0.12)
BILIRUB SERPL-MCNC: 1.5 MG/DL (ref 0.1–1.5)
BUN SERPL-MCNC: 9 MG/DL (ref 8–22)
CALCIUM SERPL-MCNC: 9.4 MG/DL (ref 8.5–10.5)
CHLORIDE SERPL-SCNC: 101 MMOL/L (ref 96–112)
CO2 SERPL-SCNC: 25 MMOL/L (ref 20–33)
CREAT SERPL-MCNC: 1.11 MG/DL (ref 0.5–1.4)
EOSINOPHIL # BLD AUTO: 0.02 K/UL (ref 0–0.51)
EOSINOPHIL # BLD AUTO: 0.04 K/UL (ref 0–0.51)
EOSINOPHIL NFR BLD: 0.2 % (ref 0–6.9)
EOSINOPHIL NFR BLD: 0.3 % (ref 0–6.9)
ERYTHROCYTE [DISTWIDTH] IN BLOOD BY AUTOMATED COUNT: 37.9 FL (ref 35.9–50)
ERYTHROCYTE [DISTWIDTH] IN BLOOD BY AUTOMATED COUNT: 38.2 FL (ref 35.9–50)
GLOBULIN SER CALC-MCNC: 2.9 G/DL (ref 1.9–3.5)
GLUCOSE SERPL-MCNC: 114 MG/DL (ref 65–99)
HCT VFR BLD AUTO: 33.7 % (ref 42–52)
HCT VFR BLD AUTO: 35.9 % (ref 42–52)
HGB BLD-MCNC: 11.3 G/DL (ref 14–18)
HGB BLD-MCNC: 12.1 G/DL (ref 14–18)
IMM GRANULOCYTES # BLD AUTO: 0.05 K/UL (ref 0–0.11)
IMM GRANULOCYTES # BLD AUTO: 0.06 K/UL (ref 0–0.11)
IMM GRANULOCYTES NFR BLD AUTO: 0.4 % (ref 0–0.9)
IMM GRANULOCYTES NFR BLD AUTO: 0.5 % (ref 0–0.9)
LYMPHOCYTES # BLD AUTO: 1.79 K/UL (ref 1–4.8)
LYMPHOCYTES # BLD AUTO: 1.98 K/UL (ref 1–4.8)
LYMPHOCYTES NFR BLD: 14.6 % (ref 22–41)
LYMPHOCYTES NFR BLD: 16.8 % (ref 22–41)
MAGNESIUM SERPL-MCNC: 2 MG/DL (ref 1.5–2.5)
MCH RBC QN AUTO: 29.8 PG (ref 27–33)
MCH RBC QN AUTO: 30 PG (ref 27–33)
MCHC RBC AUTO-ENTMCNC: 33.5 G/DL (ref 33.7–35.3)
MCHC RBC AUTO-ENTMCNC: 33.7 G/DL (ref 33.7–35.3)
MCV RBC AUTO: 88.9 FL (ref 81.4–97.8)
MCV RBC AUTO: 88.9 FL (ref 81.4–97.8)
MONOCYTES # BLD AUTO: 0.85 K/UL (ref 0–0.85)
MONOCYTES # BLD AUTO: 1.04 K/UL (ref 0–0.85)
MONOCYTES NFR BLD AUTO: 6.9 % (ref 0–13.4)
MONOCYTES NFR BLD AUTO: 8.8 % (ref 0–13.4)
NEUTROPHILS # BLD AUTO: 8.67 K/UL (ref 1.82–7.42)
NEUTROPHILS # BLD AUTO: 9.49 K/UL (ref 1.82–7.42)
NEUTROPHILS NFR BLD: 73.4 % (ref 44–72)
NEUTROPHILS NFR BLD: 77.6 % (ref 44–72)
NRBC # BLD AUTO: 0 K/UL
NRBC # BLD AUTO: 0 K/UL
NRBC BLD-RTO: 0 /100 WBC
NRBC BLD-RTO: 0 /100 WBC
PHOSPHATE SERPL-MCNC: 2.7 MG/DL (ref 2.5–4.5)
PLATELET # BLD AUTO: 172 K/UL (ref 164–446)
PLATELET # BLD AUTO: 198 K/UL (ref 164–446)
PMV BLD AUTO: 10.1 FL (ref 9–12.9)
PMV BLD AUTO: 9.6 FL (ref 9–12.9)
POTASSIUM SERPL-SCNC: 4 MMOL/L (ref 3.6–5.5)
PROT SERPL-MCNC: 7.2 G/DL (ref 6–8.2)
RBC # BLD AUTO: 3.79 M/UL (ref 4.7–6.1)
RBC # BLD AUTO: 4.04 M/UL (ref 4.7–6.1)
SODIUM SERPL-SCNC: 134 MMOL/L (ref 135–145)
WBC # BLD AUTO: 11.8 K/UL (ref 4.8–10.8)
WBC # BLD AUTO: 12.2 K/UL (ref 4.8–10.8)

## 2020-03-01 PROCEDURE — 84100 ASSAY OF PHOSPHORUS: CPT

## 2020-03-01 PROCEDURE — 80053 COMPREHEN METABOLIC PANEL: CPT

## 2020-03-01 PROCEDURE — 71045 X-RAY EXAM CHEST 1 VIEW: CPT

## 2020-03-01 PROCEDURE — 36415 COLL VENOUS BLD VENIPUNCTURE: CPT

## 2020-03-01 PROCEDURE — 770006 HCHG ROOM/CARE - MED/SURG/GYN SEMI*

## 2020-03-01 PROCEDURE — 700102 HCHG RX REV CODE 250 W/ 637 OVERRIDE(OP): Performed by: SURGERY

## 2020-03-01 PROCEDURE — 700112 HCHG RX REV CODE 229: Performed by: SURGERY

## 2020-03-01 PROCEDURE — 85025 COMPLETE CBC W/AUTO DIFF WBC: CPT | Mod: 91

## 2020-03-01 PROCEDURE — A9270 NON-COVERED ITEM OR SERVICE: HCPCS | Performed by: SURGERY

## 2020-03-01 PROCEDURE — 83735 ASSAY OF MAGNESIUM: CPT

## 2020-03-01 RX ORDER — TRAMADOL HYDROCHLORIDE 50 MG/1
50 TABLET ORAL EVERY 6 HOURS PRN
Status: DISCONTINUED | OUTPATIENT
Start: 2020-03-01 | End: 2020-03-02 | Stop reason: HOSPADM

## 2020-03-01 RX ADMIN — POLYETHYLENE GLYCOL 3350 1 PACKET: 17 POWDER, FOR SOLUTION ORAL at 18:40

## 2020-03-01 RX ADMIN — POLYETHYLENE GLYCOL 3350 1 PACKET: 17 POWDER, FOR SOLUTION ORAL at 10:43

## 2020-03-01 RX ADMIN — DOCUSATE SODIUM 100 MG: 100 CAPSULE, LIQUID FILLED ORAL at 05:09

## 2020-03-01 RX ADMIN — DOCUSATE SODIUM 100 MG: 100 CAPSULE, LIQUID FILLED ORAL at 18:40

## 2020-03-01 RX ADMIN — TRAMADOL HYDROCHLORIDE 50 MG: 50 TABLET, FILM COATED ORAL at 19:22

## 2020-03-01 RX ADMIN — ACETAMINOPHEN 1000 MG: 500 TABLET ORAL at 18:40

## 2020-03-01 RX ADMIN — ACETAMINOPHEN 1000 MG: 500 TABLET ORAL at 13:25

## 2020-03-01 RX ADMIN — MAGNESIUM HYDROXIDE 30 ML: 400 SUSPENSION ORAL at 05:09

## 2020-03-01 RX ADMIN — ACETAMINOPHEN 1000 MG: 500 TABLET ORAL at 05:09

## 2020-03-01 ASSESSMENT — ENCOUNTER SYMPTOMS
DOUBLE VISION: 0
SHORTNESS OF BREATH: 0
FEVER: 0
FOCAL WEAKNESS: 0
SPEECH CHANGE: 0
CARDIOVASCULAR NEGATIVE: 1
SENSORY CHANGE: 0
ABDOMINAL PAIN: 1
MYALGIAS: 1
FLANK PAIN: 0

## 2020-03-01 ASSESSMENT — LIFESTYLE VARIABLES: SUBSTANCE_ABUSE: 0

## 2020-03-01 NOTE — CARE PLAN
Problem: Communication  Goal: The ability to communicate needs accurately and effectively will improve  Outcome: PROGRESSING AS EXPECTED     Problem: Safety  Goal: Will remain free from injury  Outcome: PROGRESSING AS EXPECTED     Problem: Knowledge Deficit  Goal: Knowledge of disease process/condition, treatment plan, diagnostic tests, and medications will improve  Outcome: PROGRESSING AS EXPECTED     Problem: Pain Management  Goal: Pain level will decrease to patient's comfort goal  Outcome: PROGRESSING AS EXPECTED

## 2020-03-01 NOTE — PROGRESS NOTES
Patient here from SICU.   Patient has no current complaints of N/V, or pain.   Patient oriented to room and educated on use of call light.     2 RN skin check    All skin intact.
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
## 2020-03-01 NOTE — PROGRESS NOTES
Report called to BIANCA Chapa. Addressed all questions. Patient updated to new room assignment, all questions addressed. No concerns.

## 2020-03-01 NOTE — PROGRESS NOTES
Patient is A&Ox4.   Reports mild pain to abdomen, declines intervention at this time.   RAMIREZ, CMS intact, denies numbness and tingling.   Pt is ambulatory with steady gait. Calls appropriately for assistance.   On RA, denies SOB or chest pain.   Normoactive BS x 4. Tolerating regular diet. Denies N&V.   + flatus  - BM at this time. Miralax administered per MAR.   + void. Pt denies blood in urine, reports clear, yellow urine.   PIVs SL   Skin intact   Updated on POC. Belongings and call light within reach. All needs met at this time.

## 2020-03-01 NOTE — CARE PLAN
Problem: Safety  Goal: Will remain free from injury  Outcome: PROGRESSING AS EXPECTED  Goal: Will remain free from falls  Outcome: PROGRESSING AS EXPECTED  Fall precautions in place. Educated pt to call for assistance.      Problem: Pain Management  Goal: Pain level will decrease to patient's comfort goal  Outcome: PROGRESSING AS EXPECTED  Ultram PRN in place for pain relief. Pt tolerating pain with Tylenol scheduled.

## 2020-03-02 ENCOUNTER — APPOINTMENT (OUTPATIENT)
Dept: RADIOLOGY | Facility: MEDICAL CENTER | Age: 30
DRG: 963 | End: 2020-03-02
Attending: SURGERY
Payer: COMMERCIAL

## 2020-03-02 VITALS
OXYGEN SATURATION: 98 % | WEIGHT: 176.81 LBS | DIASTOLIC BLOOD PRESSURE: 62 MMHG | SYSTOLIC BLOOD PRESSURE: 116 MMHG | HEART RATE: 87 BPM | BODY MASS INDEX: 26.8 KG/M2 | HEIGHT: 68 IN | RESPIRATION RATE: 17 BRPM | TEMPERATURE: 98.2 F

## 2020-03-02 LAB
ANION GAP SERPL CALC-SCNC: 9 MMOL/L (ref 0–11.9)
BASOPHILS # BLD AUTO: 0.3 % (ref 0–1.8)
BASOPHILS # BLD: 0.04 K/UL (ref 0–0.12)
BUN SERPL-MCNC: 7 MG/DL (ref 8–22)
CALCIUM SERPL-MCNC: 8.8 MG/DL (ref 8.5–10.5)
CHLORIDE SERPL-SCNC: 99 MMOL/L (ref 96–112)
CO2 SERPL-SCNC: 25 MMOL/L (ref 20–33)
CREAT SERPL-MCNC: 0.93 MG/DL (ref 0.5–1.4)
EOSINOPHIL # BLD AUTO: 0.04 K/UL (ref 0–0.51)
EOSINOPHIL NFR BLD: 0.3 % (ref 0–6.9)
ERYTHROCYTE [DISTWIDTH] IN BLOOD BY AUTOMATED COUNT: 37.4 FL (ref 35.9–50)
GLUCOSE SERPL-MCNC: 99 MG/DL (ref 65–99)
HCT VFR BLD AUTO: 35.2 % (ref 42–52)
HGB BLD-MCNC: 11.8 G/DL (ref 14–18)
IMM GRANULOCYTES # BLD AUTO: 0.03 K/UL (ref 0–0.11)
IMM GRANULOCYTES NFR BLD AUTO: 0.3 % (ref 0–0.9)
LYMPHOCYTES # BLD AUTO: 2.57 K/UL (ref 1–4.8)
LYMPHOCYTES NFR BLD: 21.9 % (ref 22–41)
MCH RBC QN AUTO: 29.8 PG (ref 27–33)
MCHC RBC AUTO-ENTMCNC: 33.5 G/DL (ref 33.7–35.3)
MCV RBC AUTO: 88.9 FL (ref 81.4–97.8)
MONOCYTES # BLD AUTO: 0.91 K/UL (ref 0–0.85)
MONOCYTES NFR BLD AUTO: 7.8 % (ref 0–13.4)
NEUTROPHILS # BLD AUTO: 8.14 K/UL (ref 1.82–7.42)
NEUTROPHILS NFR BLD: 69.4 % (ref 44–72)
NRBC # BLD AUTO: 0 K/UL
NRBC BLD-RTO: 0 /100 WBC
PLATELET # BLD AUTO: 181 K/UL (ref 164–446)
PMV BLD AUTO: 9.8 FL (ref 9–12.9)
POTASSIUM SERPL-SCNC: 4.3 MMOL/L (ref 3.6–5.5)
RBC # BLD AUTO: 3.96 M/UL (ref 4.7–6.1)
SODIUM SERPL-SCNC: 133 MMOL/L (ref 135–145)
WBC # BLD AUTO: 11.7 K/UL (ref 4.8–10.8)

## 2020-03-02 PROCEDURE — 85025 COMPLETE CBC W/AUTO DIFF WBC: CPT

## 2020-03-02 PROCEDURE — A9270 NON-COVERED ITEM OR SERVICE: HCPCS | Performed by: SURGERY

## 2020-03-02 PROCEDURE — 71045 X-RAY EXAM CHEST 1 VIEW: CPT

## 2020-03-02 PROCEDURE — 700102 HCHG RX REV CODE 250 W/ 637 OVERRIDE(OP): Performed by: SURGERY

## 2020-03-02 PROCEDURE — 36415 COLL VENOUS BLD VENIPUNCTURE: CPT

## 2020-03-02 PROCEDURE — 80048 BASIC METABOLIC PNL TOTAL CA: CPT

## 2020-03-02 PROCEDURE — 700112 HCHG RX REV CODE 229: Performed by: SURGERY

## 2020-03-02 RX ORDER — ACETAMINOPHEN 500 MG
1000 TABLET ORAL EVERY 6 HOURS
Qty: 30 TAB | Refills: 0 | COMMUNITY
Start: 2020-03-02

## 2020-03-02 RX ORDER — TRAMADOL HYDROCHLORIDE 50 MG/1
50 TABLET ORAL EVERY 6 HOURS PRN
Qty: 28 TAB | Refills: 0 | Status: SHIPPED | OUTPATIENT
Start: 2020-03-02 | End: 2020-03-09

## 2020-03-02 RX ADMIN — ACETAMINOPHEN 1000 MG: 500 TABLET ORAL at 05:32

## 2020-03-02 RX ADMIN — TRAMADOL HYDROCHLORIDE 50 MG: 50 TABLET, FILM COATED ORAL at 02:07

## 2020-03-02 RX ADMIN — ACETAMINOPHEN 1000 MG: 500 TABLET ORAL at 00:27

## 2020-03-02 RX ADMIN — MAGNESIUM HYDROXIDE 30 ML: 400 SUSPENSION ORAL at 05:32

## 2020-03-02 RX ADMIN — POLYETHYLENE GLYCOL 3350 1 PACKET: 17 POWDER, FOR SOLUTION ORAL at 05:32

## 2020-03-02 RX ADMIN — DOCUSATE SODIUM 100 MG: 100 CAPSULE, LIQUID FILLED ORAL at 05:32

## 2020-03-02 ASSESSMENT — ENCOUNTER SYMPTOMS
SHORTNESS OF BREATH: 0
CARDIOVASCULAR NEGATIVE: 1
ABDOMINAL PAIN: 1
FEVER: 0
FLANK PAIN: 0
DOUBLE VISION: 0
NAUSEA: 0
MYALGIAS: 1
SPEECH CHANGE: 0
FOCAL WEAKNESS: 0
VOMITING: 0
SENSORY CHANGE: 0

## 2020-03-02 ASSESSMENT — LIFESTYLE VARIABLES: SUBSTANCE_ABUSE: 0
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
## 2020-03-02 NOTE — PROGRESS NOTES
Bedside report received.  Assessment complete.  A&O x 4. Patient calls appropriately.  Patient ambulates with no assist.    Patient has 5/10 pain. Pain managed with prescribed medications.  Denies N&V. Tolerating regular diet.  + void, - flatus, - BM.  Patient denies SOB.  Patient pleasant with staff and resting in bed.  Review plan with of care with patient. Call light and personal belongings with in reach. Hourly rounding in place. All needs met at this time.
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
## 2020-03-02 NOTE — DISCHARGE INSTRUCTIONS
Discharge Instructions    Discharged to home by car with self. Discharged via walking, hospital escort: Refused.  Special equipment needed: Not Applicable    Be sure to schedule a follow-up appointment with your primary care doctor or any specialists as instructed.     Discharge Plan:   Diet Plan: Discussed  Activity Level: Discussed  Confirmed Follow up Appointment: Patient to Call and Schedule Appointment  Confirmed Symptoms Management: Discussed  Medication Reconciliation Updated: Yes  Influenza Vaccine Indication: Patient Refuses    I understand that a diet low in cholesterol, fat, and sodium is recommended for good health. Unless I have been given specific instructions below for another diet, I accept this instruction as my diet prescription.   Other diet: Regular    Special Instructions: None    · Is patient discharged on Warfarin / Coumadin?   No       Splenic Injury  A splenic injury is an injury of the spleen. The spleen is an organ located in the upper left area of your abdomen, just under your ribs. Your spleen filters and cleans your blood. It also stores blood cells and destroys cells that are worn out. Your spleen is also important for fighting disease.  Splenic injuries can vary. In some cases, the spleen may only be bruised with some bleeding inside the covering and around the spleen. Splenic injuries may also cause a deep tear or cut into the spleen (lacerated spleen). Some splenic injuries can cause the spleen to break open (rupture).  What are the causes?  Splenic injuries can be caused by a direct blow (blunt trauma) from:  · Car accidents.  · Contact sports.  · Falls.  Gunshot wounds or knife wounds (penetrating injuries) can also cause a splenic injury.  What increases the risk?  You may be at greater risk for a splenic injury if you have a disease that can cause the spleen to become enlarged. These include:  · Alcoholic liver disease.  · Viral infections, especially mononucleosis.  What are  the signs or symptoms?  A minor splenic injury often causes no symptoms or only minor abdominal pain. If the injury causes severe bleeding, your blood pressure may rapidly decrease. This may cause:  · Dizziness or light-headedness.  · Rapid heart rate.  · Difficulty breathing.  · Fainting.  · Sweating with clammy skin.  Other signs and symptoms of a splenic injury can include:  · Very bad abdominal pain.  · Pain in the left shoulder.  · Pain when the abdomen is pressed (tenderness).  · Nausea.  · Swelling or bruising of the abdomen.  How is this diagnosed?  Your health care provider may suspect a splenic injury based on your signs and symptoms, especially if you were recently in an accident or you recently got hurt. Your health care provider will do a physical exam. Imaging tests may be done to confirm the diagnosis. These may include:  · Ultrasound.  · CT scan.  You may have frequent blood tests for a few days after the injury to monitor your condition.  How is this treated?  Treatment depends on the type of splenic injury you have and how bad it is. Your health care provider will develop a treatment plan specific to your needs.  · Less severe injuries may be treated with:  ¨ Observation.  ¨ Interventional radiology. This involves using flexible tubes (catheters) to stop the bleeding from inside the blood vessel.  · More severe injuries may require hospitalization in the intensive care unit (ICU). While you are in the ICU:  ¨ Your fluid and blood levels will be monitored closely.  ¨ You will get fluids through an IV tube as needed.  ¨ You may need follow-up scans to check whether your spleen is able to heal itself. If the injury is getting worse, you may need surgery.  ¨ You may receive donated blood (transfusion).  ¨ You may have a long needle inserted into your abdomen to remove any blood that has collected inside the spleen (hematoma).  · Surgery. If your blood pressure is too low, you may need emergency  surgery. This may include:  ¨ Repairing a laceration.  ¨ Removing part of the spleen.  ¨ Removing the entire spleen (splenectomy).  Follow these instructions at home:  · Take medicines only as directed by your health care provider.  · Rest at home.  · Do not participate in any strenuous activity until your health care provider says it is safe to do so.  · Do not lift anything that is heavier than 10 lb (4.5 kg).  · Do not participate in contact sports until your health care provider says it is safe to do so.  · Stay up-to-date on vaccinations as told by your health care provider.  Contact a health care provider if:  · You have a fever.  · You have new or increasing pain in your abdomen or in your left shoulder.  Get help right away if:  · You have signs or symptoms of internal bleeding. Watch for:  ¨ Sweating.  ¨ Dizziness.  ¨ Weakness.  ¨ Cold and clammy skin.  ¨ Fainting.  · You have chest pain or difficulty breathing.  This information is not intended to replace advice given to you by your health care provider. Make sure you discuss any questions you have with your health care provider.  Document Released: 10/09/2007 Document Revised: 08/15/2017 Document Reviewed: 09/02/2015  [employer] Interactive Patient Education © 2017 [employer]      Acute Kidney Injury, Adult  Acute kidney injury (FERNIE) occurs when there is sudden (acute) damage to the kidneys. A small amount of kidney damage may not cause problems, but a large amount of damage may make it difficult or impossible for the kidneys to work the way they should. FERNIE may develop into long-lasting (chronic) kidney disease. Early detection and treatment of FERNIE may prevent kidney damage from becoming permanent or getting worse.  What are the causes?  Common causes of this condition include:  · A problem with blood flow to the kidneys. This may be caused by:  ¨ Blood loss.  ¨ Heart and blood vessel (cardiovascular) disease.  ¨ Severe burns.  ¨ Liver  disease.  · Direct damage to the kidneys. This may be caused by:  ¨ Some medicines.  ¨ A kidney infection.  ¨ Poisoning.  ¨ Being around or in contact with poisonous (toxic) substances.  ¨ A surgical wound.  ¨ A hard, direct force to the kidney area.  · A sudden blockage of urine flow. This may be caused by:  ¨ Cancer.  ¨ Kidney stones.  ¨ Enlarged prostate in males.  What are the signs or symptoms?  Symptoms develop slowly and may not be obvious until the kidney damage becomes severe. It is possible to have FERNIE for years without showing any symptoms. Symptoms of this condition can include:  · Swelling (edema) of the face, legs, ankles, or feet.  · Numbness, tingling, or loss of feeling (sensation) in the hands or feet.  · Tiredness (lethargy).  · Nausea or vomiting.  · Confusion or trouble concentrating.  · Problems with urination, such as:  ¨ Painful or burning feeling during urination.  ¨ Decreased urine production.  ¨ Frequent urination, especially at night.  ¨ Bloody urine.  · Muscle twitches and cramps, especially in the legs.  · Shortness of breath.  · Weakness.  · Constant itchiness.  · Loss of appetite.  · Metallic taste in the mouth.  · Trouble sleeping.  · Pale lining of the eyelids and surface of the eye (conjunctiva).  How is this diagnosed?  This condition may be diagnosed with various tests. Tests may include:  · Blood tests.  · Urine tests.  · Imaging tests.  · A test in which a sample of tissue is removed from the kidneys to be looked at under a microscope (kidney biopsy).  How is this treated?  Treatment of FERNIE varies depending on the cause and severity of the kidney damage. In mild cases, treatment may not be needed. The kidneys may heal on their own.  If FERNIE is more severe, your health care provider will treat the cause of the kidney damage, help the kidneys heal, and prevent problems from occurring. Severe cases may require a procedure to remove toxic wastes from the body (dialysis) or surgery  to repair kidney damage. Surgery may involve:  · Repair of a torn kidney.  · Removal of a urine flow obstruction.  Follow these instructions at home:  · Follow your prescribed diet.  · Take over-the-counter and prescription medicines only as told by your health care provider.  ¨ Do not take any new medicines unless approved by your health care provider. Many medicines can worsen your kidney damage.  ¨ Do not take any vitamin and mineral supplements unless approved by your health care provider. Many nutritional supplements can worsen your kidney damage.  ¨ The dose of some medicines that you take may need to be adjusted.  · Do not use any tobacco products, such as cigarettes, chewing tobacco, and e-cigarettes. If you need help quitting, ask your health care provider.  · Keep all follow-up visits as told by your health care provider. This is important.  · Keep track of your blood pressure. Report changes in your blood pressure as told by your health care provider.  · Achieve and maintain a healthy weight. If you need help with this, ask your health care provider.  · Start or continue an exercise plan. Try to exercise at least 30 minutes a day, 5 days a week.  · Stay current with immunizations as told by your health care provider.  Where to find more information:  · American Association of Kidney Patients: www.aakp.org  · National Kidney Foundation: www.kidney.org  · American Kidney Fund: www.akfinc.org  · [employer] [facility]: www.lifeoptions.org and www.kidneyschool.org  Contact a health care provider if:  · Your symptoms get worse.  · You develop new symptoms.  Get help right away if:  · You develop symptoms of end-stage kidney disease, which include:  ¨ Headaches.  ¨ Abnormally dark or light skin.  ¨ Numbness in the hands or feet.  ¨ Easy bruising.  ¨ Frequent hiccups.  ¨ Chest pain.  ¨ Shortness of breath.  ¨ End of menstruation in women.  · You have a fever.  · You have decreased urine  production.  · You have pain or bleeding when you urinate.  This information is not intended to replace advice given to you by your health care provider. Make sure you discuss any questions you have with your health care provider.  Document Released: 07/02/2012 Document Revised: 07/27/2017 Document Reviewed: 08/16/2013  [employer] Interactive Patient Education © 2017 [employer]    Tramadol tablets  What is this medicine?  TRAMADOL (TRA ma dole) is a pain reliever. It is used to treat moderate to severe pain in adults.  This medicine may be used for other purposes; ask your health care provider or pharmacist if you have questions.  COMMON BRAND NAME(S): Ultram  What should I tell my health care provider before I take this medicine?  They need to know if you have any of these conditions:  -brain tumor  -depression  -drug abuse or addiction  -head injury  -if you frequently drink alcohol containing drinks  -kidney disease or trouble passing urine  -liver disease  -lung disease, asthma, or breathing problems  -seizures or epilepsy  -suicidal thoughts, plans, or attempt; a previous suicide attempt by you or a family member  -an unusual or allergic reaction to tramadol, codeine, other medicines, foods, dyes, or preservatives  -pregnant or trying to get pregnant  -breast-feeding  How should I use this medicine?  Take this medicine by mouth with a full glass of water. Follow the directions on the prescription label. You can take it with or without food. If it upsets your stomach, take it with food. Do not take your medicine more often than directed.  A special MedGuide will be given to you by the pharmacist with each prescription and refill. Be sure to read this information carefully each time.  Talk to your pediatrician regarding the use of this medicine in children. Special care may be needed.  Overdosage: If you think you have taken too much of this medicine contact a poison control center or emergency room at  once.  NOTE: This medicine is only for you. Do not share this medicine with others.  What if I miss a dose?  If you miss a dose, take it as soon as you can. If it is almost time for your next dose, take only that dose. Do not take double or extra doses.  What may interact with this medicine?  Do not take this medication with any of the following medicines:  -MAOIs like Carbex, Eldepryl, Marplan, Nardil, and Parnate  This medicine may also interact with the following medications:  -alcohol  -antihistamines for allergy, cough and cold  -certain medicines for anxiety or sleep  -certain medicines for depression like amitriptyline, fluoxetine, sertraline  -certain medicines for migraine headache like almotriptan, eletriptan, frovatriptan, naratriptan, rizatriptan, sumatriptan, zolmitriptan  -certain medicines for seizures like carbamazepine, oxcarbazepine, phenobarbital, primidone  -certain medicines that treat or prevent blood clots like warfarin  -digoxin  -furazolidone  -general anesthetics like halothane, isoflurane, methoxyflurane, propofol  -linezolid  -local anesthetics like lidocaine, pramoxine, tetracaine  -medicines that relax muscles for surgery  -other narcotic medicines for pain or cough  -phenothiazines like chlorpromazine, mesoridazine, prochlorperazine, thioridazine  -procarbazine  This list may not describe all possible interactions. Give your health care provider a list of all the medicines, herbs, non-prescription drugs, or dietary supplements you use. Also tell them if you smoke, drink alcohol, or use illegal drugs. Some items may interact with your medicine.  What should I watch for while using this medicine?  Tell your doctor or health care professional if your pain does not go away, if it gets worse, or if you have new or a different type of pain. You may develop tolerance to the medicine. Tolerance means that you will need a higher dose of the medicine for pain relief. Tolerance is normal and is  expected if you take this medicine for a long time.  Do not suddenly stop taking your medicine because you may develop a severe reaction. Your body becomes used to the medicine. This does NOT mean you are addicted. Addiction is a behavior related to getting and using a drug for a non-medical reason. If you have pain, you have a medical reason to take pain medicine. Your doctor will tell you how much medicine to take. If your doctor wants you to stop the medicine, the dose will be slowly lowered over time to avoid any side effects.  There are different types of narcotic medicines (opiates). If you take more than one type at the same time or if you are taking another medicine that also causes drowsiness, you may have more side effects. Give your health care provider a list of all medicines you use. Your doctor will tell you how much medicine to take. Do not take more medicine than directed. Call emergency for help if you have problems breathing or unusual sleepiness.  You may get drowsy or dizzy. Do not drive, use machinery, or do anything that needs mental alertness until you know how this medicine affects you. Do not stand or sit up quickly, especially if you are an older patient. This reduces the risk of dizzy or fainting spells. Alcohol can increase or decrease the effects of this medicine. Avoid alcoholic drinks.  You may have constipation. Try to have a bowel movement at least every 2 to 3 days. If you do not have a bowel movement for 3 days, call your doctor or health care professional.  Your mouth may get dry. Chewing sugarless gum or sucking hard candy, and drinking plenty of water may help. Contact your doctor if the problem does not go away or is severe.  What side effects may I notice from receiving this medicine?  Side effects that you should report to your doctor or health care professional as soon as possible:  -allergic reactions like skin rash, itching or hives, swelling of the face, lips, or  tongue  -breathing problems  -confusion  -seizures  -signs and symptoms of low blood pressure like dizziness; feeling faint or lightheaded, falls; unusually weak or tired  -trouble passing urine or change in the amount of urine  Side effects that usually do not require medical attention (report to your doctor or health care professional if they continue or are bothersome):  -constipation  -dry mouth  -nausea, vomiting  -tiredness  This list may not describe all possible side effects. Call your doctor for medical advice about side effects. You may report side effects to FDA at 4-006-FDA-2892.  Where should I keep my medicine?  Keep out of the reach of children.  This medicine may cause accidental overdose and death if it taken by other adults, children, or pets. Mix any unused medicine with a substance like cat litter or coffee grounds. Then throw the medicine away in a sealed container like a sealed bag or a coffee can with a lid. Do not use the medicine after the expiration date.  Store at room temperature between 15 and 30 degrees C (59 and 86 degrees F).  NOTE: This sheet is a summary. It may not cover all possible information. If you have questions about this medicine, talk to your doctor, pharmacist, or health care provider.  © 2018 [employer] (2016-09-11 09:00:04)          Depression / Suicide Risk    As you are discharged from this [facility] facility, it is important to learn how to keep safe from harming yourself.    Recognize the warning signs:  · Abrupt changes in personality, positive or negative- including increase in energy   · Giving away possessions  · Change in eating patterns- significant weight changes-  positive or negative  · Change in sleeping patterns- unable to sleep or sleeping all the time   · Unwillingness or inability to communicate  · Depression  · Unusual sadness, discouragement and loneliness  · Talk of wanting to die  · Neglect of personal appearance   · Rebelliousness-  reckless behavior  · Withdrawal from people/activities they love  · Confusion- inability to concentrate     If you or a loved one observes any of these behaviors or has concerns about self-harm, here's what you can do:  · Talk about it- your feelings and reasons for harming yourself  · Remove any means that you might use to hurt yourself (examples: pills, rope, extension cords, firearm)  · Get professional help from the community (Mental Health, Substance Abuse, psychological counseling)  · Do not be alone:Call your Safe Contact- someone whom you trust who will be there for you.  · Call your local CRISIS HOTLINE 584-9059 or 783-233-1471  · Call your local Children's Mobile Crisis Response Team [place] (238) 098-4346 or www.MainOne  · Call the toll free National Suicide Prevention Hotlines   · National Suicide Prevention Lifeline 681-479-RYTU (7114)  · National Hope Line Network 800-SUICIDE (882-3018)

## 2020-03-02 NOTE — PROGRESS NOTES
Pt discharged to home. IV d/c'd. Regular diet. Activity as tolerated. Pt understands verbal and written discharge instructions. Prescriptions given. Pt verbalized understanding. Pt agrees to return to hospital for worsening symptoms (specific instruction given for splenic and renal lac).